# Patient Record
Sex: MALE | Race: WHITE | NOT HISPANIC OR LATINO | URBAN - METROPOLITAN AREA
[De-identification: names, ages, dates, MRNs, and addresses within clinical notes are randomized per-mention and may not be internally consistent; named-entity substitution may affect disease eponyms.]

---

## 2017-12-28 ENCOUNTER — EMERGENCY (EMERGENCY)
Facility: HOSPITAL | Age: 42
LOS: 0 days | Discharge: HOME | End: 2017-12-28

## 2017-12-28 DIAGNOSIS — Z79.01 LONG TERM (CURRENT) USE OF ANTICOAGULANTS: ICD-10-CM

## 2017-12-28 DIAGNOSIS — Z79.899 OTHER LONG TERM (CURRENT) DRUG THERAPY: ICD-10-CM

## 2017-12-28 DIAGNOSIS — Z79.82 LONG TERM (CURRENT) USE OF ASPIRIN: ICD-10-CM

## 2017-12-28 DIAGNOSIS — L53.8 OTHER SPECIFIED ERYTHEMATOUS CONDITIONS: ICD-10-CM

## 2017-12-28 DIAGNOSIS — I80.02 PHLEBITIS AND THROMBOPHLEBITIS OF SUPERFICIAL VESSELS OF LEFT LOWER EXTREMITY: ICD-10-CM

## 2017-12-28 DIAGNOSIS — E78.4 OTHER HYPERLIPIDEMIA: ICD-10-CM

## 2017-12-28 DIAGNOSIS — E78.00 PURE HYPERCHOLESTEROLEMIA, UNSPECIFIED: ICD-10-CM

## 2017-12-28 DIAGNOSIS — M79.605 PAIN IN LEFT LEG: ICD-10-CM

## 2017-12-28 DIAGNOSIS — Z98.61 CORONARY ANGIOPLASTY STATUS: ICD-10-CM

## 2017-12-28 DIAGNOSIS — E11.9 TYPE 2 DIABETES MELLITUS WITHOUT COMPLICATIONS: ICD-10-CM

## 2017-12-28 DIAGNOSIS — Z79.84 LONG TERM (CURRENT) USE OF ORAL HYPOGLYCEMIC DRUGS: ICD-10-CM

## 2017-12-28 DIAGNOSIS — I25.2 OLD MYOCARDIAL INFARCTION: ICD-10-CM

## 2017-12-28 DIAGNOSIS — I10 ESSENTIAL (PRIMARY) HYPERTENSION: ICD-10-CM

## 2017-12-28 DIAGNOSIS — Z79.2 LONG TERM (CURRENT) USE OF ANTIBIOTICS: ICD-10-CM

## 2017-12-28 DIAGNOSIS — R07.9 CHEST PAIN, UNSPECIFIED: ICD-10-CM

## 2022-05-30 ENCOUNTER — EMERGENCY (EMERGENCY)
Facility: HOSPITAL | Age: 47
LOS: 0 days | Discharge: HOME | End: 2022-05-30
Attending: EMERGENCY MEDICINE | Admitting: EMERGENCY MEDICINE
Payer: COMMERCIAL

## 2022-05-30 VITALS
SYSTOLIC BLOOD PRESSURE: 124 MMHG | HEIGHT: 71 IN | WEIGHT: 300.05 LBS | HEART RATE: 99 BPM | DIASTOLIC BLOOD PRESSURE: 64 MMHG | OXYGEN SATURATION: 99 % | TEMPERATURE: 98 F | RESPIRATION RATE: 17 BRPM

## 2022-05-30 DIAGNOSIS — E78.00 PURE HYPERCHOLESTEROLEMIA, UNSPECIFIED: ICD-10-CM
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
## 2022-05-30 DIAGNOSIS — E78.5 HYPERLIPIDEMIA, UNSPECIFIED: ICD-10-CM

## 2022-05-30 DIAGNOSIS — S20.20XA CONTUSION OF THORAX, UNSPECIFIED, INITIAL ENCOUNTER: ICD-10-CM

## 2022-05-30 DIAGNOSIS — Z79.84 LONG TERM (CURRENT) USE OF ORAL HYPOGLYCEMIC DRUGS: ICD-10-CM

## 2022-05-30 DIAGNOSIS — E11.9 TYPE 2 DIABETES MELLITUS WITHOUT COMPLICATIONS: ICD-10-CM
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
## 2022-05-30 DIAGNOSIS — R07.81 PLEURODYNIA: ICD-10-CM

## 2022-05-30 DIAGNOSIS — I10 ESSENTIAL (PRIMARY) HYPERTENSION: ICD-10-CM

## 2022-05-30 LAB
ALBUMIN SERPL ELPH-MCNC: 4.7 G/DL — SIGNIFICANT CHANGE UP (ref 3.5–5.2)
ALP SERPL-CCNC: 56 U/L — SIGNIFICANT CHANGE UP (ref 30–115)
ALT FLD-CCNC: 131 U/L — HIGH (ref 0–41)
ANION GAP SERPL CALC-SCNC: 16 MMOL/L — HIGH (ref 7–14)
AST SERPL-CCNC: 121 U/L — HIGH (ref 0–41)
BASOPHILS # BLD AUTO: 0.06 K/UL — SIGNIFICANT CHANGE UP (ref 0–0.2)
BASOPHILS NFR BLD AUTO: 0.5 % — SIGNIFICANT CHANGE UP (ref 0–1)
BILIRUB DIRECT SERPL-MCNC: <0.2 MG/DL — SIGNIFICANT CHANGE UP (ref 0–0.3)
BILIRUB INDIRECT FLD-MCNC: >0.1 MG/DL — LOW (ref 0.2–1.2)
BILIRUB SERPL-MCNC: 0.3 MG/DL — SIGNIFICANT CHANGE UP (ref 0.2–1.2)
BUN SERPL-MCNC: 19 MG/DL — SIGNIFICANT CHANGE UP (ref 10–20)
CALCIUM SERPL-MCNC: 9.9 MG/DL — SIGNIFICANT CHANGE UP (ref 8.5–10.1)
CHLORIDE SERPL-SCNC: 97 MMOL/L — LOW (ref 98–110)
CO2 SERPL-SCNC: 20 MMOL/L — SIGNIFICANT CHANGE UP (ref 17–32)
CREAT SERPL-MCNC: 1 MG/DL — SIGNIFICANT CHANGE UP (ref 0.7–1.5)
EGFR: 92 ML/MIN/1.73M2 — SIGNIFICANT CHANGE UP
EOSINOPHIL # BLD AUTO: 0.15 K/UL — SIGNIFICANT CHANGE UP (ref 0–0.7)
EOSINOPHIL NFR BLD AUTO: 1.3 % — SIGNIFICANT CHANGE UP (ref 0–8)
GLUCOSE SERPL-MCNC: 194 MG/DL — HIGH (ref 70–99)
HCT VFR BLD CALC: 45.5 % — SIGNIFICANT CHANGE UP (ref 42–52)
HGB BLD-MCNC: 14.5 G/DL — SIGNIFICANT CHANGE UP (ref 14–18)
IMM GRANULOCYTES NFR BLD AUTO: 1.2 % — HIGH (ref 0.1–0.3)
LIDOCAIN IGE QN: 53 U/L — SIGNIFICANT CHANGE UP (ref 7–60)
LYMPHOCYTES # BLD AUTO: 18.5 % — LOW (ref 20.5–51.1)
LYMPHOCYTES # BLD AUTO: 2.11 K/UL — SIGNIFICANT CHANGE UP (ref 1.2–3.4)
MCHC RBC-ENTMCNC: 27.9 PG — SIGNIFICANT CHANGE UP (ref 27–31)
MCHC RBC-ENTMCNC: 31.9 G/DL — LOW (ref 32–37)
MCV RBC AUTO: 87.5 FL — SIGNIFICANT CHANGE UP (ref 80–94)
MONOCYTES # BLD AUTO: 1.1 K/UL — HIGH (ref 0.1–0.6)
MONOCYTES NFR BLD AUTO: 9.6 % — HIGH (ref 1.7–9.3)
NEUTROPHILS # BLD AUTO: 7.87 K/UL — HIGH (ref 1.4–6.5)
NEUTROPHILS NFR BLD AUTO: 68.9 % — SIGNIFICANT CHANGE UP (ref 42.2–75.2)
NRBC # BLD: 0 /100 WBCS — SIGNIFICANT CHANGE UP (ref 0–0)
PLATELET # BLD AUTO: 381 K/UL — SIGNIFICANT CHANGE UP (ref 130–400)
POTASSIUM SERPL-MCNC: 4.4 MMOL/L — SIGNIFICANT CHANGE UP (ref 3.5–5)
POTASSIUM SERPL-MCNC: 6.2 MMOL/L — CRITICAL HIGH (ref 3.5–5)
POTASSIUM SERPL-SCNC: 4.4 MMOL/L — SIGNIFICANT CHANGE UP (ref 3.5–5)
POTASSIUM SERPL-SCNC: 6.2 MMOL/L — CRITICAL HIGH (ref 3.5–5)
PROT SERPL-MCNC: 7.3 G/DL — SIGNIFICANT CHANGE UP (ref 6–8)
RBC # BLD: 5.2 M/UL — SIGNIFICANT CHANGE UP (ref 4.7–6.1)
RBC # FLD: 14.2 % — SIGNIFICANT CHANGE UP (ref 11.5–14.5)
SODIUM SERPL-SCNC: 133 MMOL/L — LOW (ref 135–146)
WBC # BLD: 11.43 K/UL — HIGH (ref 4.8–10.8)
WBC # FLD AUTO: 11.43 K/UL — HIGH (ref 4.8–10.8)

## 2022-05-30 PROCEDURE — 71260 CT THORAX DX C+: CPT | Mod: 26,MA

## 2022-05-30 PROCEDURE — 99285 EMERGENCY DEPT VISIT HI MDM: CPT

## 2022-05-30 PROCEDURE — 71045 X-RAY EXAM CHEST 1 VIEW: CPT | Mod: 26

## 2022-05-30 PROCEDURE — 74177 CT ABD & PELVIS W/CONTRAST: CPT | Mod: 26,MA

## 2022-05-30 PROCEDURE — 70450 CT HEAD/BRAIN W/O DYE: CPT | Mod: 26,MA

## 2022-05-30 RX ORDER — PRASUGREL 5 MG/1
0 TABLET, FILM COATED ORAL
Qty: 0 | Refills: 0 | DISCHARGE

## 2022-05-30 RX ORDER — INSULIN GLARGINE 100 [IU]/ML
0 INJECTION, SOLUTION SUBCUTANEOUS
Qty: 0 | Refills: 0 | DISCHARGE

## 2022-05-30 RX ORDER — SEMAGLUTIDE 0.68 MG/ML
0 INJECTION, SOLUTION SUBCUTANEOUS
Qty: 0 | Refills: 0 | DISCHARGE

## 2022-05-30 RX ORDER — GLYBURIDE 5 MG
0 TABLET ORAL
Qty: 0 | Refills: 0 | DISCHARGE

## 2022-05-30 RX ORDER — PIOGLITAZONE HYDROCHLORIDE 15 MG/1
0 TABLET ORAL
Qty: 0 | Refills: 0 | DISCHARGE

## 2022-05-30 RX ORDER — ACETAMINOPHEN 500 MG
975 TABLET ORAL ONCE
Refills: 0 | Status: COMPLETED | OUTPATIENT
Start: 2022-05-30 | End: 2022-05-30

## 2022-05-30 RX ORDER — METFORMIN HYDROCHLORIDE 850 MG/1
0 TABLET ORAL
Qty: 0 | Refills: 0 | DISCHARGE

## 2022-05-30 RX ORDER — FENOFIBRATE,MICRONIZED 130 MG
0 CAPSULE ORAL
Qty: 0 | Refills: 0 | DISCHARGE

## 2022-05-30 RX ORDER — DAPAGLIFLOZIN 10 MG/1
0 TABLET, FILM COATED ORAL
Qty: 0 | Refills: 0 | DISCHARGE

## 2022-05-30 RX ORDER — ATORVASTATIN CALCIUM 80 MG/1
0 TABLET, FILM COATED ORAL
Qty: 0 | Refills: 0 | DISCHARGE

## 2022-05-30 RX ORDER — EZETIMIBE 10 MG/1
0 TABLET ORAL
Qty: 0 | Refills: 0 | DISCHARGE

## 2022-05-30 RX ORDER — LISINOPRIL 2.5 MG/1
0 TABLET ORAL
Qty: 0 | Refills: 0 | DISCHARGE

## 2022-05-30 RX ORDER — METOPROLOL TARTRATE 50 MG
0 TABLET ORAL
Qty: 0 | Refills: 0 | DISCHARGE

## 2022-05-30 RX ORDER — AMLODIPINE BESYLATE 2.5 MG/1
0 TABLET ORAL
Qty: 0 | Refills: 0 | DISCHARGE

## 2022-05-30 RX ADMIN — Medication 975 MILLIGRAM(S): at 21:19

## 2022-05-30 NOTE — ED PROVIDER NOTE - PHYSICAL EXAMINATION
CONSTITUTIONAL: Well-appearing; in no apparent distress.   HEAD: Normocephalic; atraumatic.   EYES: Pupils are round and reactive, extra-ocular muscles are intact. Eyelids are normal in appearance without swelling or lesions.   ENT: Hearing is intact with good acuity to spoken voice.  Patient is speaking clearly, not muffled and airway is intact.   NECK: No midline tenderness  RESPIRATORY: No signs of respiratory distress. Lung sounds are clear in all lobes bilaterally without rales, rhonchi, or wheezes.  CARDIOVASCULAR: Regular rate and rhythm.   Chest: B/L lower ribs with no discoloration or deformity; tender to palpation.   GI: Abdomen is soft, non-tender, and without distention. Bowel sounds are present and normoactive in all four quadrants. No masses are noted.   BACK: No evidence of trauma or deformity. No midline tenderness. Normal ROM.   PELVIS: No evidence of trauma or deformity. No tenderness to palpation.  EXT: Normal appearance and ROM in all four extremities. No tenderness to palpation and distal pulses are normal. Sensation to the upper and lower extremities is normal bilaterally.  NEURO: A & O x 3. Normal speech.    PSYCHOLOGICAL: Appropriate mood and affect. Good judgement and insight.

## 2022-05-30 NOTE — ED PROVIDER NOTE - OBJECTIVE STATEMENT
47-year-old male with history of hypertension, hyperlipidemia, and diabetes who presents after a fall.  Patient is also taking Effient.  Reports that he was walking today and tripped over his own shoelace and fell forward and hit his chest into a structure wall.  Reports having pain on his bilateral lower ribs that is worse with breathing.  Reports that symptoms did not improve so he decided to come to the ED.  Denies head injury, neck injury, and LOC.  Denies pain and discomfort elsewhere.

## 2022-05-30 NOTE — ED PROVIDER NOTE - CLINICAL SUMMARY MEDICAL DECISION MAKING FREE TEXT BOX
47-year-old male presents with fall landing on cement wall divider around 5 AM patient with upper abdominal bowel lower chest pain on antiplatelet TTP no ecchymosis respiratory clear to auscultation no head injury no C-spine tenderness neurologically intact.  CT 's no acute traumatic injury  Patient to be discharged from ED in well appearing condition. Any available test results were discussed with and printed  for patient.  Verbal instructions given, including instructions to return to ED immediately for any new, worsening, or concerning symptoms. Limitations of ED work up discussed.  Patient reports understanding of above with capacity and insight. Written discharge instructions additionally given, including follow-up plan.

## 2022-05-30 NOTE — ED PROVIDER NOTE - PROGRESS NOTE DETAILS
CT scan shows no traumatic injury. Patient is well appearing with normal vitals and clinically stable to be discharged. Discussed D/C instruction. Strict return to ER precautions discussed. Patient understands discharge instruction, ER return precautions, and follow-up instructions without further questions.

## 2022-05-30 NOTE — ED ADULT NURSE NOTE - NSIMPLEMENTINTERV_GEN_ALL_ED
Implemented All Fall Risk Interventions:  [place] to call system. Call bell, personal items and telephone within reach. Instruct patient to call for assistance. Room bathroom lighting operational. Non-slip footwear when patient is off stretcher. Physically safe environment: no spills, clutter or unnecessary equipment. Stretcher in lowest position, wheels locked, appropriate side rails in place. Provide visual cue, wrist band, yellow gown, etc. Monitor gait and stability. Monitor for mental status changes and reorient to person, place, and time. Review medications for side effects contributing to fall risk. Reinforce activity limits and safety measures with patient and family.

## 2022-05-30 NOTE — ED PROVIDER NOTE - NS ED ATTENDING STATEMENT MOD
This was a shared visit with the L[facility]. I reviewed and verified the documentation and independently performed the documented:

## 2022-05-30 NOTE — ED PROVIDER NOTE - NS ED ROS FT
Constitutional: Negative for fever, chills, and fatigue.  HENT: Negative for headache  Eyes: Negative for eye pain, and vision change.  Cardiovascular: Negative for palpitation.  Respiratory: Negative for SOB, wheezing, cough and sputum production.  Gastrointestinal: Negative for nausea, vomiting, abdominal pain  Neurological: Negative for dizziness, syncope, and loss of consciousness.  Musculoskeletal: + B/L lower rib pain. Negative for joint swelling, arthralgias, back pain, neck pain, and calf cramps.  Hematological: bruise/bleed easily.

## 2022-05-30 NOTE — ED PROVIDER NOTE - PATIENT PORTAL LINK FT
You can access the FollowMyHealth Patient Portal offered by [facility] by registering at the following website: [email or web address]. By joining [facility]’s FollowMyHealth portal, you will also be able to view your health information using other applications (apps) compatible with our system.

## 2022-05-30 NOTE — ED PROVIDER NOTE - NSFOLLOWUPINSTRUCTIONS_ED_ALL_ED_FT
Follow up withy your primary care doctor in 1-2 days    Chest Contusion, Adult  A chest contusion is a deep bruise to the chest. Contusions are usually the result of a blunt injury to tissues under the skin. The injury can damage the small blood vessels under the skin, which causes bleeding under the skin. The skin overlying the contusion may turn blue, purple, or yellow. Minor injuries may give you a painless contusion, but more severe contusions may stay painful and swollen for a few weeks.    What are the causes?  A contusion is usually caused by a hard hit (blow), trauma, or direct force to your chest, such as:    A motor vehicle accident.  Falls.  Bicycle injuries.  Contact sport injuries.    What increases the risk?  You may be at a higher risk for a chest contusion if you play a sport in which falls and contact are common, such as football or soccer.    What are the signs or symptoms?  Symptoms of this condition include:    Chest swelling.  Pain and tenderness of the chest.  Discomfort with certain movements of the upper torso.  Discoloration of the chest. The area may have redness and then turn blue, purple, or yellow.  Discomfort when taking deep breaths.    How is this diagnosed?  A chest contusion is diagnosed from a physical exam and your medical history. An X-ray may be needed to determine if there were any associated injuries, such as broken bones (fractures). Sometimes other tests such as CT scans, ultrasounds, or MRIs may be needed if internal injuries are suspected.    A test that shows the amount of oxygen in your blood (pulse oximetry) may be done if you have trouble breathing.    How is this treated?  Often, the best treatment for a chest contusion is resting and applying ice to the injured area. Deep-breathing exercises may be recommended to reduce the risk of pneumonia. Oxygen therapy may be given if you have trouble breathing or have low oxygen levels. Over-the-counter medicines may also be recommended for pain control.    Follow these instructions at home:  If directed, apply ice to the injured area.    Put ice in a plastic bag.  Place a towel between your skin and the bag.  Leave the ice on for 20 minutes, 2–3 times per day.    Take over-the-counter and prescription medicines only as told by your health care provider.  Do any deep-breathing exercises as told by your health care provider, if this applies.  Do not lie down flat on your back. Keep your head and chest raised (elevated) when you are resting or sleeping.  Do not use any products that contain nicotine or tobacco, such as cigarettes and e-cigarettes. If you need help quitting, ask your health care provider.  Image Do not lift anything that causes you discomfort or pain.  Contact a health care provider if:  Your swelling or pain is not relieved with medicines or treatment.  You have increased bruising or swelling.  You have pain that is getting worse.  Your symptoms have not improved after one week.  Get help right away if:  You have a sudden, significant increase in pain.  You have difficulty breathing.  You have dizziness, weakness, or fainting.  You have blood in your urine or stool.  You cough up blood or you vomit blood.  Summary  A chest contusion is a deep bruise to the chest that is usually caused by a hard hit, trauma, or direct force to your chest.  Treatment for a chest contusion may include resting and applying ice to the injured area.  Contact a health care provider if you have problems breathing or if your pain does not improve with treatment.  This information is not intended to replace advice given to you by your health care provider. Make sure you discuss any questions you have with your health care provider.